# Patient Record
Sex: FEMALE | Race: WHITE | Employment: FULL TIME | URBAN - METROPOLITAN AREA
[De-identification: names, ages, dates, MRNs, and addresses within clinical notes are randomized per-mention and may not be internally consistent; named-entity substitution may affect disease eponyms.]

---

## 2017-01-20 ENCOUNTER — TELEPHONE (OUTPATIENT)
Dept: OBGYN | Facility: CLINIC | Age: 32
End: 2017-01-20

## 2017-01-20 NOTE — TELEPHONE ENCOUNTER
Reason for Call:  Other call back    Detailed comments: She is having spotting issues and  It getting worse    Phone Number Patient can be reached at: Home number on file [phone] (home)    Best Time: Anytime    Can we leave a detailed message on this number? YES    Call taken on 1/20/2017 at 10:52 AM by Maritza Ramirez

## 2017-01-20 NOTE — TELEPHONE ENCOUNTER
Called pt back. She stated at her last annual in December she discussed with Janell Crawford some DUB she was having. Pt states since then her symptoms have gotten worse. Pt states she started to spot on her new OCP during week 1 and week 2 of her pill pack and was having a normal period flow despite taking active pills. Pt stated she then stopped taking the active pills and is now having heavy menstrual flow. Pt states she is needing to change her pads every 2-3 hours, and she is passing some clots pea sized-dime sized, pt states she has been having headaches. Pt states this bleeding has been affecting her quality of life and her relationship with her fiance. Pt denies dizziness/lightheadedness, chest pain, or shortness of breath. Pt states she was told to try to loose some weight but pt states she is unable to despite exercise and changing her eating habits. Informed pt to make an appointment to be seen, and in the meantime if she developed dizziness, lightheadedness, changing pads every hour, chest pain, or SOB to go to ER to be evaluated. pt transferred to scheduling to make an appointment.

## 2017-01-23 ENCOUNTER — OFFICE VISIT (OUTPATIENT)
Dept: OBGYN | Facility: CLINIC | Age: 32
End: 2017-01-23
Payer: COMMERCIAL

## 2017-01-23 VITALS
BODY MASS INDEX: 24.75 KG/M2 | SYSTOLIC BLOOD PRESSURE: 102 MMHG | DIASTOLIC BLOOD PRESSURE: 70 MMHG | HEIGHT: 66 IN | WEIGHT: 154 LBS

## 2017-01-23 DIAGNOSIS — N93.8 DUB (DYSFUNCTIONAL UTERINE BLEEDING): Primary | ICD-10-CM

## 2017-01-23 PROCEDURE — 99213 OFFICE O/P EST LOW 20 MIN: CPT | Performed by: OBSTETRICS & GYNECOLOGY

## 2017-01-23 NOTE — MR AVS SNAPSHOT
"              After Visit Summary   2017    Jennifer Pennington    MRN: [id number]           Patient Information     Date Of Birth          1985        Visit Information        Provider Department      2017 10:00 AM Tanner Benjamin MD [facility]        Today's Diagnoses     DUB (dysfunctional uterine bleeding)    -  1        Follow-ups after your visit        Who to contact     If you have questions or need follow up information about today's clinic visit or your schedule please contact [facility] directly at [phone].  Normal or non-critical lab and imaging results will be communicated to you by SimpleSitehart, letter or phone within 4 business days after the clinic has received the results. If you do not hear from us within 7 days, please contact the clinic through [facility] or phone. If you have a critical or abnormal lab result, we will notify you by phone as soon as possible.  Submit refill requests through Ixtens or call your pharmacy and they will forward the refill request to us. Please allow 3 business days for your refill to be completed.          Additional Information About Your Visit        MyChart Information     Ixtens lets you send messages to your doctor, view your test results, renew your prescriptions, schedule appointments and more. To sign up, go to [email or web address] . Click on \"Log in\" on the left side of the screen, which will take you to the Welcome page. Then click on \"Sign up Now\" on the right side of the page.     You will be asked to enter the access code listed below, as well as some personal information. Please follow the directions to create your username and password.     Your access code is: [email or web address]  Expires: 3/6/2017  9:29 AM     Your access code will  in 90 days. If you need help or a new code, please call your [facility] clinic or [phone].        Care EveryWhere ID     This is your Care EveryWhere ID. This " "could be used by other organizations to access your [facility] medical records  [id number]        Your Vitals Were     Height BMI (Body Mass Index) Last Period             5' 6.25\" (1.683 m) 24.66 kg/m2 (LMP Unknown)          Blood Pressure from Last 3 Encounters:   01/23/17 102/70   12/06/16 112/76   11/18/15 100/58    Weight from Last 3 Encounters:   01/23/17 154 lb (69.854 kg)   12/06/16 154 lb (69.854 kg)   11/18/15 143 lb (64.864 kg)              Today, you had the following     No orders found for display         Today's Medication Changes          These changes are accurate as of: 1/23/17 11:55 AM.  If you have any questions, ask your nurse or doctor.               These medicines have changed or have updated prescriptions.        Dose/Directions    * norgestrel-ethinyl estradiol 0.3-30 MG-MCG per tablet   Commonly known as:  LO/OVRAL   This may have changed:  Another medication with the same name was added. Make sure you understand how and when to take each.   Used for:  Encounter for surveillance of contraceptive pills   Changed by:  Janell Crawford APRN CNP        Dose:  1 tablet   Take 1 tablet by mouth daily   Quantity:  84 tablet   Refills:  4       * norgestrel-ethinyl estradiol 0.5-50 MG-MCG per tablet   Commonly known as:  OVRAL   This may have changed:  You were already taking a medication with the same name, and this prescription was added. Make sure you understand how and when to take each.   Used for:  DUB (dysfunctional uterine bleeding)   Changed by:  Tanner Benjamin MD        Dose:  1 tablet   Take 1 tablet by mouth daily   Quantity:  84 tablet   Refills:  3       * Notice:  This list has 2 medication(s) that are the same as other medications prescribed for you. Read the directions carefully, and ask your doctor or other care provider to review them with you.         Where to get your medicines      These medications were sent to [facility] [address] [place] [address] - [address] AT [facility] [address]  [address] [place] [address]     Phone:  [phone]    - norgestrel-ethinyl estradiol 0.5-50 MG-MCG per tablet             Primary Care Provider Office Phone # Fax #    EBER Molina [phone]       [facility] [address] [place] [address] [address]  [facility] [address]        Thank you!     Thank you for choosing [facility]  for your care. Our goal is always to provide you with excellent care. Hearing back from our patients is one way we can continue to improve our services. Please take a few minutes to complete the written survey that you may receive in the mail after your visit with us. Thank you!             Your Updated Medication List - Protect others around you: Learn how to safely use, store and throw away your medicines at www.disposemymeds.org.          This list is accurate as of: 1/23/17 11:55 AM.  Always use your most recent med list.                   Brand Name Dispense Instructions for use    Multi-vitamin Tabs tablet      Take 1 tablet by mouth daily       * norgestrel-ethinyl estradiol 0.3-30 MG-MCG per tablet    LO/OVRAL    84 tablet    Take 1 tablet by mouth daily       * norgestrel-ethinyl estradiol 0.5-50 MG-MCG per tablet    OVRAL    84 tablet    Take 1 tablet by mouth daily       * Notice:  This list has 2 medication(s) that are the same as other medications prescribed for you. Read the directions carefully, and ask your doctor or other care provider to review them with you.

## 2017-01-23 NOTE — PROGRESS NOTES
SUBJECTIVE:                                                   Jennifer Pennington is a 31 year old female who presents to clinic today for the following health issue(s):  Patient presents with:  Abnormal Bleeding Problem: has been on OCP and started spotting with proper use of taking pill x 4 months, this past cycle worse with heavier bleeding      HPI:The patient is seen at this time for evaluation of dysfunctional bleeding. She has been on birth control pills for abnormal bleeding and has had a progressive increase in breakthrough in the third week to the point where she is now having a full on menses starting a week early.She has no other reason for any abnormal bleeding. Her general review of systems is negative. She takes her pills religiously every 24 hours and is very compliant. She has been on Lo/Ovral offer allup to this point.      No LMP recorded (lmp unknown)..   Patient is sexually active, .  Using oral contraceptives for contraception.    reports that she has never smoked. She has never used smokeless tobacco.  STD testing offered?  Declined  Health maintenance updated:  yes        Problem list and histories reviewed & adjusted, as indicated.  Additional history: as documented.    Patient Active Problem List   Diagnosis     Encounter for initial prescription of contraceptives     Past Surgical History   Procedure Laterality Date     No history of surgery        Social History   Substance Use Topics     Smoking status: Never Smoker      Smokeless tobacco: Never Used     Alcohol Use: 1.8 oz/week     3 Standard drinks or equivalent per week      Problem (# of Occurrences) Relation (Name,Age of Onset)    Breast Cancer (1) Mother    DIABETES (1) Mother (57): type II    Ovarian Cancer (1) Mother    Skin Cancer (1) Mother            Current Outpatient Prescriptions   Medication Sig     norgestrel-ethinyl estradiol (LO/OVRAL) 0.3-30 MG-MCG per tablet Take 1 tablet by mouth daily     multivitamin,  "therapeutic with minerals (MULTI-VITAMIN) TABS Take 1 tablet by mouth daily     No current facility-administered medications for this visit.     Allergies   Allergen Reactions     Seasonal Allergies        ROS:  12 point review of systems negative other than symptoms noted below.  Genitourinary: Heavy Bleeding with Period, Irregular Menses, Spotting and Vaginal Discharge    OBJECTIVE:     /70 mmHg  Ht 1.683 m (5' 6.25\")  Wt 69.854 kg (154 lb)  BMI 24.66 kg/m2  LMP  (LMP Unknown)  Body mass index is 24.66 kg/(m^2).    Exam:  Constitutional:  Appearance: Well nourished, well developed alert, in no acute distress  Gastrointestinal:  Abdominal Examination:  Abdomen nontender to palpation, tone normal without rigidity or guarding, no masses present, umbilicus without lesions; Liver/Spleen:  No hepatomegaly present, liver nontender to palpation; Hernias:  No hernias present  Lymphatic: Lymph Nodes:  No other lymphadenopathy present  Skin:General Inspection:  No rashes present, no lesions present, no areas of discoloration; Genitalia and Groin:  No rashes present, no lesions present, no areas of discoloration, no masses present.  Neurologic/Psychiatric:  Mental Status:  Oriented X3   Pelvic Exam:  External Genitalia:     Normal appearance for age, no discharge present, no tenderness present, no inflammatory lesions present, color normal  Vagina:     Normal vaginal vault without central or paravaginal defects, no discharge present, no inflammatory lesions present, no masses present  Bladder:     Nontender to palpation  Urethra:   Urethral Body:  Urethra palpation normal, urethra structural support normal   Urethral Meatus:  No erythema or lesions present  Cervix:     Appearance healthy, no lesions present, nontender to palpation, no bleeding present  Uterus:     Nontender to palpation, no masses present, position anteflexed, mobility: normal  Adnexa:     No adnexal tenderness present, no adnexal masses " present  Perineum:     Perineum within normal limits, no evidence of trauma, no rashes or skin lesions present  Anus:     Anus within normal limits, no hemorrhoids present  Inguinal Lymph Nodes:     No lymphadenopathy present  Pubic Hair:     Normal pubic hair distribution for age  Genitalia and Groin:     No rashes present, no lesions present, no areas of discoloration, no masses present     In-Clinic Test Results:      ASSESSMENT/PLAN:                                                              The patient has dysfunctional uterine bleeding. I believe this is because of how low dose her pills are. This is not a issue of lack of compliance.Her examination shows no abnormalities in the vagina or on the cervix. There no polyps. Bimanual examination was unremarkable. We will increase her pills up to ovral and see her back in 2 months. She is to start her new pill pack tonight.    Tanner Benjamin MD  [facility] [place]

## 2017-11-22 DIAGNOSIS — N93.8 DUB (DYSFUNCTIONAL UTERINE BLEEDING): ICD-10-CM

## 2017-11-22 NOTE — TELEPHONE ENCOUNTER
norgestrel-ethinyl estradiol (OVRAL) 0.5-50 MG-MCG per tablet  Last Written Prescription Date:  1/23/17  Last Fill Quantity: 84,   # refills: 3  Last Office Visit with [facility] [place]:  1/23/17  Future Office visit: 12/13/17    Routing refill request to provider for review/approval because:  Refill sent. Pt has appointment.

## 2017-12-13 ENCOUNTER — OFFICE VISIT (OUTPATIENT)
Dept: OBGYN | Facility: CLINIC | Age: 32
End: 2017-12-13
Payer: COMMERCIAL

## 2017-12-13 VITALS
DIASTOLIC BLOOD PRESSURE: 64 MMHG | HEART RATE: 80 BPM | WEIGHT: 152.8 LBS | HEIGHT: 67 IN | SYSTOLIC BLOOD PRESSURE: 100 MMHG | BODY MASS INDEX: 23.98 KG/M2

## 2017-12-13 DIAGNOSIS — Z30.41 ENCOUNTER FOR SURVEILLANCE OF CONTRACEPTIVE PILLS: ICD-10-CM

## 2017-12-13 DIAGNOSIS — Z01.419 ENCOUNTER FOR GYNECOLOGICAL EXAMINATION WITHOUT ABNORMAL FINDING: Primary | ICD-10-CM

## 2017-12-13 DIAGNOSIS — Z23 NEED FOR PROPHYLACTIC VACCINATION AND INOCULATION AGAINST INFLUENZA: ICD-10-CM

## 2017-12-13 PROCEDURE — 90686 IIV4 VACC NO PRSV 0.5 ML IM: CPT | Performed by: NURSE PRACTITIONER

## 2017-12-13 PROCEDURE — G0145 SCR C/V CYTO,THINLAYER,RESCR: HCPCS | Performed by: NURSE PRACTITIONER

## 2017-12-13 PROCEDURE — 87624 HPV HI-RISK TYP POOLED RSLT: CPT | Performed by: NURSE PRACTITIONER

## 2017-12-13 PROCEDURE — 99395 PREV VISIT EST AGE 18-39: CPT | Mod: 25 | Performed by: NURSE PRACTITIONER

## 2017-12-13 PROCEDURE — 90471 IMMUNIZATION ADMIN: CPT | Performed by: NURSE PRACTITIONER

## 2017-12-13 RX ORDER — NORETHINDRONE ACETATE AND ETHINYL ESTRADIOL 1.5-30(21)
1 KIT ORAL DAILY
Qty: 84 TABLET | Refills: 3 | Status: SHIPPED | OUTPATIENT
Start: 2017-12-13 | End: 2018-11-02

## 2017-12-13 ASSESSMENT — ANXIETY QUESTIONNAIRES
5. BEING SO RESTLESS THAT IT IS HARD TO SIT STILL: NOT AT ALL
2. NOT BEING ABLE TO STOP OR CONTROL WORRYING: NOT AT ALL
7. FEELING AFRAID AS IF SOMETHING AWFUL MIGHT HAPPEN: NOT AT ALL
3. WORRYING TOO MUCH ABOUT DIFFERENT THINGS: NOT AT ALL
IF YOU CHECKED OFF ANY PROBLEMS ON THIS QUESTIONNAIRE, HOW DIFFICULT HAVE THESE PROBLEMS MADE IT FOR YOU TO DO YOUR WORK, TAKE CARE OF THINGS AT HOME, OR GET ALONG WITH OTHER PEOPLE: NOT DIFFICULT AT ALL
GAD7 TOTAL SCORE: 0
6. BECOMING EASILY ANNOYED OR IRRITABLE: NOT AT ALL
1. FEELING NERVOUS, ANXIOUS, OR ON EDGE: NOT AT ALL

## 2017-12-13 ASSESSMENT — PATIENT HEALTH QUESTIONNAIRE - PHQ9
5. POOR APPETITE OR OVEREATING: NOT AT ALL
SUM OF ALL RESPONSES TO PHQ QUESTIONS 1-9: 0

## 2017-12-13 NOTE — LETTER
December 21, 2017    Jennifer Pennington  [address]  [facility] [address]    Dear Jennifer,  We are happy to inform you that your PAP smear result from 12/13/17 is normal.  We are now able to do a follow up test on PAP smears. The DNA test is for HPV (Human Papilloma Virus). Cervical cancer is closely linked with certain types of HPV. Your result showed no evidence of high risk HPV.  Therefore we recommend you return in 1 year for your next pap smear.  You will still need to return to the clinic every year for an annual exam and other preventive tests.  Please contact the clinic at [phone] with any questions.  Sincerely,    Janell Crawford, EBER CNP/rlm

## 2017-12-13 NOTE — PROGRESS NOTES
Jennifer is a 32 year old  female who presents for annual exam.     Besides routine health maintenance,  she would like to discuss her heavy menses on 2nd day and would like to start having mammograms due to family Hx of breast cancer..    HPI:  The patient's PCP is  EBER Molina CNP.  Pt here today for her annual exam. She has had an eventful year in getting engaged and !     She still struggles with her OCP's. The BTB she was experiencing at the beginning of January has stopped, but day 2 of her menses is so heavy she is changing a super plus tampon every 2 hours and her cramps are back.      GYNECOLOGIC HISTORY:    Patient's last menstrual period was 2017.  Her current contraception method is: oral contraceptives.  She  reports that she has never smoked. She has never used smokeless tobacco.      Patient is sexually active.  STD testing offered?  Declined  Last PHQ-9 score on record =   PHQ-9 SCORE 2017   Total Score 0     Last GAD7 score on record =   PATEL-7 SCORE 2017   Total Score 0     Alcohol Score = 4    HEALTH MAINTENANCE:  Cholesterol: 10/23/2014   Total= 189, Triglycerides=98, HDL=48, VIH=609    Cholesterol   Date Value Ref Range Status   10/23/2014 189 <200 mg/dL Final      Last Mammo: Never had done., Result: not applicable, Next Mammo: Due at age 42.   Pap: (  Lab Results   Component Value Date    PAP NIL 2016    PAP NIL 2015      Colonoscopy:  Never had done, Result: not applicable, Next Colonoscopy: Due at age 52.   Dexa:  Never had done.    Health maintenance updated:  yes    HISTORY:  Obstetric History       T0      L0     SAB0   TAB0   Ectopic0   Multiple0   Live Births0           Patient Active Problem List   Diagnosis     Encounter for initial prescription of contraceptives     Past Surgical History:   Procedure Laterality Date     NO HISTORY OF SURGERY        Social History   Substance Use Topics     Smoking status: Never  "Smoker     Smokeless tobacco: Never Used     Alcohol use 1.8 oz/week     3 Standard drinks or equivalent per week      Problem (# of Occurrences) Relation (Name,Age of Onset)    Breast Cancer (1) Mother (55)    DIABETES (1) Mother (57): type II    Ovarian Cancer (1) Mother (43)    Skin Cancer (1) Mother            Current Outpatient Prescriptions   Medication Sig     norethindrone-ethinyl estradiol-iron (MICROGESTIN FE1.5/30) 1.5-30 MG-MCG per tablet Take 1 tablet by mouth daily     multivitamin, therapeutic with minerals (MULTI-VITAMIN) TABS Take 1 tablet by mouth daily     No current facility-administered medications for this visit.      Allergies   Allergen Reactions     Seasonal Allergies        Past medical, surgical, social and family histories were reviewed and updated in EPIC.    ROS:   12 point review of systems negative other than symptoms noted below.  Genitourinary: Heavy Bleeding with Period    EXAM:  /64  Pulse 80  Ht 5' 6.5\" (1.689 m)  Wt 152 lb 12.8 oz (69.3 kg)  LMP 11/20/2017  BMI 24.29 kg/m2   BMI: Body mass index is 24.29 kg/(m^2).    PHYSICAL EXAM:  Constitutional:  Appearance: Well nourished, well developed, alert, in no acute distress  Neck:  Lymph Nodes:  No lymphadenopathy present    Thyroid:  Gland size normal, nontender, no nodules or masses present  on palpation  Chest:  Respiratory Effort:  Breathing unlabored  Cardiovascular:    Heart: Auscultation:  Regular rate, normal rhythm, no murmurs present  Breasts: Inspection of Breasts:  No lymphadenopathy present., Palpation of Breasts and Axillae:  No masses present on palpation, no breast tenderness., Axillary Lymph Nodes:  No lymphadenopathy present. and No nodularity, asymmetry or nipple discharge bilaterally.  Gastrointestinal:   Abdominal Examination:  Abdomen nontender to palpation, tone normal without rigidity or guarding, no masses present, umbilicus without lesions   Liver and Spleen:  No hepatomegaly present, liver " nontender to palpation    Hernias:  No hernias present  Lymphatic: Lymph Nodes:  No other lymphadenopathy present  Skin:  General Inspection:  No rashes present, no lesions present, no areas of  discoloration    Genitalia and Groin:  No rashes present, no lesions present, no areas of  discoloration, no masses present  Neurologic/Psychiatric:    Mental Status:  Oriented X3     Pelvic Exam:  External Genitalia:     Normal appearance for age, no discharge present, no tenderness present, no inflammatory lesions present, color normal  Vagina:     Normal vaginal vault without central or paravaginal defects, no discharge present, no inflammatory lesions present, no masses present  Bladder:     Nontender to palpation  Urethra:   Urethral Body:  Urethra palpation normal, urethra structural support normal   Urethral Meatus:  No erythema or lesions present  Cervix:     Appearance healthy, no lesions present, nontender to palpation, no bleeding present  Uterus:     Uterus: firm, normal sized and nontender, anteverted in position.   Adnexa:     No adnexal tenderness present, no adnexal masses present  Perineum:     Perineum within normal limits, no evidence of trauma, no rashes or skin lesions present  Anus:     Anus within normal limits, no hemorrhoids present  Inguinal Lymph Nodes:     No lymphadenopathy present  Pubic Hair:     Normal pubic hair distribution for age  Genitalia and Groin:     No rashes present, no lesions present, no areas of discoloration, no masses present      COUNSELING:   Special attention given to:        Regular exercise       Healthy diet/nutrition       Contraception    BMI: Body mass index is 24.29 kg/(m^2).        ASSESSMENT:  32 year old female with satisfactory annual exam.    ICD-10-CM    1. Encounter for gynecological examination without abnormal finding Z01.419 Pap imaged thin layer screen with HPV - recommended age 30 - 65     HPV High Risk Types DNA Cervical   2. Encounter for surveillance of  contraceptive pills Z30.41 norethindrone-ethinyl estradiol-iron (MICROGESTIN FE1.5/30) 1.5-30 MG-MCG per tablet   3. Need for prophylactic vaccination and inoculation against influenza Z23 FLU VAC, SPLIT VIRUS IM > 3 YO (QUADRIVALENT) [97507]     Vaccine Administration, Initial [07721]       PLAN:  Healthy, normal gyn exam. Will change OCP to higher progestin. Flu shot today. She will check on mammograms, her mother was diagnosed at age 55, but she thinks her sister gets mammos annually.  Annual pap screening recommended. ca125 next year again.    EBER Molina CNP

## 2017-12-13 NOTE — MR AVS SNAPSHOT
"              After Visit Summary   12/13/2017    Jennifer Pennington    MRN: [id number]           Patient Information     Date Of Birth          1985        Visit Information        Provider Department      12/13/2017 8:00 AM Janell Crawford APRN CNP [facility]        Today's Diagnoses     Encounter for gynecological examination without abnormal finding    -  1    Encounter for surveillance of contraceptive pills        Need for prophylactic vaccination and inoculation against influenza           Follow-ups after your visit        Follow-up notes from your care team     Return in about 1 year (around 12/13/2018).      Who to contact     If you have questions or need follow up information about today's clinic visit or your schedule please contact [facility] directly at [phone].  Normal or non-critical lab and imaging results will be communicated to you by [employer], letter or phone within 4 business days after the clinic has received the results. If you do not hear from us within 7 days, please contact the clinic through [employer] or phone. If you have a critical or abnormal lab result, we will notify you by phone as soon as possible.  Submit refill requests through [employer] or call your pharmacy and they will forward the refill request to us. Please allow 3 business days for your refill to be completed.          Additional Information About Your Visit        MyChart Information     [employer] lets you send messages to your doctor, view your test results, renew your prescriptions, schedule appointments and more. To sign up, go to [email or web address] . Click on \"Log in\" on the left side of the screen, which will take you to the Welcome page. Then click on \"Sign up Now\" on the right side of the page.     You will be asked to enter the access code listed below, as well as some personal information. Please follow the directions to create your username and password.   " "  Your access code is: [email or web address]  Expires: 3/13/2018  9:05 AM     Your access code will  in 90 days. If you need help or a new code, please call your [place] clinic or [phone].        Care EveryWhere ID     This is your Care EveryWhere ID. This could be used by other organizations to access your [place] medical records  [id number]        Your Vitals Were     Pulse Height Last Period BMI (Body Mass Index)          80 5' 6.5\" (1.689 m) 2017 24.29 kg/m2         Blood Pressure from Last 3 Encounters:   17 100/64   17 102/70   16 112/76    Weight from Last 3 Encounters:   17 152 lb 12.8 oz (69.3 kg)   17 154 lb (69.9 kg)   16 154 lb (69.9 kg)              We Performed the Following     FLU VAC, SPLIT VIRUS IM > 3 YO (QUADRIVALENT) [52733]     HPV High Risk Types DNA Cervical     Pap imaged thin layer screen with HPV - recommended age 30 - 65     Vaccine Administration, Initial [97879]          Today's Medication Changes          These changes are accurate as of: 17  9:05 AM.  If you have any questions, ask your nurse or doctor.               Start taking these medicines.        Dose/Directions    norethindrone-ethinyl estradiol-iron 1.5-30 MG-MCG per tablet   Commonly known as:  MICROGESTIN FE1.5/30   Used for:  Encounter for surveillance of contraceptive pills   Started by:  Janell Crawford APRN CNP        Dose:  1 tablet   Take 1 tablet by mouth daily   Quantity:  84 tablet   Refills:  3         Stop taking these medicines if you haven't already. Please contact your care team if you have questions.     norgestrel-ethinyl estradiol 0.3-30 MG-MCG per tablet   Commonly known as:  LO/OVRAL   Stopped by:  Janell Crawford APRN CNP           OGESTREL 0.5-50 MG-MCG per tablet   Generic drug:  norgestrel-ethinyl estradiol   Stopped by:  Janell Crawford APRN CNP                Where to get your medicines      These medications were sent to [facility] - [place] - [address] AT [facility] OF [address]  [address] [address]     Phone:  [phone]     norethindrone-ethinyl estradiol-iron 1.5-30 MG-MCG per tablet                Primary Care Provider Office Phone # Fax #    Janell Crawford, APRN [phone] [address]  [place] [address]        Equal Access to Services     [facility]: Hadii aad ku hadasho Soomaali, waaxda luqadaha, qaybta kaalmada adeegyada, laly may . So [facility] [phone].    ATENCIÓN: Si habla español, tiene a ayala disposición servicios gratuitos de asistencia lingüística. [facility] [phone].    We comply with applicable federal civil rights laws and [place] laws. We do not discriminate on the basis of race, color, national origin, age, disability, sex, sexual orientation, or gender identity.            Thank you!     Thank you for choosing [facility] [place]  for your care. Our goal is always to provide you with excellent care. Hearing back from our patients is one way we can continue to improve our services. Please take a few minutes to complete the written survey that you may receive in the mail after your visit with us. Thank you!             Your Updated Medication List - Protect others around you: Learn how to safely use, store and throw away your medicines at www.disposemymeds.org.          This list is accurate as of: 12/13/17  9:05 AM.  Always use your most recent med list.                   Brand Name Dispense Instructions for use Diagnosis    Multi-vitamin Tabs tablet      Take 1 tablet by mouth daily        norethindrone-ethinyl estradiol-iron 1.5-30 MG-MCG per tablet    MICROGESTIN FE1.5/30    84 tablet    Take 1 tablet by mouth daily    Encounter for surveillance of contraceptive pills

## 2017-12-13 NOTE — PROGRESS NOTES

## 2017-12-14 ASSESSMENT — ANXIETY QUESTIONNAIRES: GAD7 TOTAL SCORE: 0

## 2017-12-15 LAB
COPATH REPORT: NORMAL
PAP: NORMAL

## 2017-12-19 LAB
FINAL DIAGNOSIS: NORMAL
HPV HR 12 DNA CVX QL NAA+PROBE: NEGATIVE
HPV16 DNA SPEC QL NAA+PROBE: NEGATIVE
HPV18 DNA SPEC QL NAA+PROBE: NEGATIVE
SPECIMEN DESCRIPTION: NORMAL

## 2018-02-17 DIAGNOSIS — N93.8 DUB (DYSFUNCTIONAL UTERINE BLEEDING): ICD-10-CM

## 2018-02-19 NOTE — TELEPHONE ENCOUNTER
OGESTREL 0.5-50      Last Written Prescription Date:  11/22/17  Last Fill Quantity: 84,   # refills: 0  Last Office Visit: 12/13/17  Future Office visit:   none    At 12/13/17 visit pt was switched to new OCP. Rx denied.

## 2018-11-02 DIAGNOSIS — Z30.41 ENCOUNTER FOR SURVEILLANCE OF CONTRACEPTIVE PILLS: ICD-10-CM

## 2018-11-02 RX ORDER — NORETHINDRONE ACETATE AND ETHINYL ESTRADIOL 1.5-30(21)
KIT ORAL
Qty: 84 TABLET | Refills: 0 | Status: SHIPPED | OUTPATIENT
Start: 2018-11-02 | End: 2018-12-26

## 2018-11-02 NOTE — TELEPHONE ENCOUNTER
"Requested Prescriptions   Pending Prescriptions Disp Refills     BLISOVI FE 1.5/30 1.5-30 MG-MCG per tablet [Pharmacy Med Name: BLISOVI FE 1.5/30 TABLETS 28S] 84 tablet 0     Sig: TAKE 1 TABLET BY MOUTH DAILY    Contraceptives Protocol Passed    11/2/2018  9:23 AM       Passed - Patient is not a current smoker if age is 35 or older       Passed - Recent (12 mo) or future (30 days) visit within the authorizing provider's specialty    Patient had office visit in the last 12 months or has a visit in the next 30 days with authorizing provider or within the authorizing provider's specialty.  See \"Patient Info\" tab in inbasket, or \"Choose Columns\" in Meds & Orders section of the refill encounter.             Passed - No active pregnancy on record       Passed - No positive pregnancy test in past 12 months        Last Written Prescription Date:  12/13/17  Last Fill Quantity: 84,  # refills: 3   Last office visit: 12/13/2017 with prescribing provider:  Janell Crawford   Future Office Visit:  None    "

## 2018-12-26 ENCOUNTER — OFFICE VISIT (OUTPATIENT)
Dept: OBGYN | Facility: CLINIC | Age: 33
End: 2018-12-26
Payer: COMMERCIAL

## 2018-12-26 VITALS
BODY MASS INDEX: 26.3 KG/M2 | HEIGHT: 67 IN | WEIGHT: 167.6 LBS | SYSTOLIC BLOOD PRESSURE: 102 MMHG | HEART RATE: 74 BPM | DIASTOLIC BLOOD PRESSURE: 68 MMHG

## 2018-12-26 DIAGNOSIS — Z13.220 ENCOUNTER FOR LIPID SCREENING FOR CARDIOVASCULAR DISEASE: ICD-10-CM

## 2018-12-26 DIAGNOSIS — Z30.41 ENCOUNTER FOR SURVEILLANCE OF CONTRACEPTIVE PILLS: ICD-10-CM

## 2018-12-26 DIAGNOSIS — Z13.1 SCREENING FOR DIABETES MELLITUS: ICD-10-CM

## 2018-12-26 DIAGNOSIS — Z01.419 ENCOUNTER FOR GYNECOLOGICAL EXAMINATION WITHOUT ABNORMAL FINDING: Primary | ICD-10-CM

## 2018-12-26 DIAGNOSIS — Z80.41 FAMILY HISTORY OF OVARIAN CANCER: ICD-10-CM

## 2018-12-26 DIAGNOSIS — Z13.6 ENCOUNTER FOR LIPID SCREENING FOR CARDIOVASCULAR DISEASE: ICD-10-CM

## 2018-12-26 LAB
CANCER AG125 SERPL-ACNC: 14 U/ML (ref 0–30)
CHOLEST SERPL-MCNC: 254 MG/DL
GLUCOSE BLD-MCNC: 82 MG/DL (ref 70–99)
HDLC SERPL-MCNC: 54 MG/DL
LDLC SERPL CALC-MCNC: 164 MG/DL
NONHDLC SERPL-MCNC: 200 MG/DL
TRIGL SERPL-MCNC: 182 MG/DL

## 2018-12-26 PROCEDURE — 99395 PREV VISIT EST AGE 18-39: CPT | Performed by: NURSE PRACTITIONER

## 2018-12-26 PROCEDURE — 87624 HPV HI-RISK TYP POOLED RSLT: CPT | Performed by: NURSE PRACTITIONER

## 2018-12-26 PROCEDURE — 82947 ASSAY GLUCOSE BLOOD QUANT: CPT | Performed by: NURSE PRACTITIONER

## 2018-12-26 PROCEDURE — 80061 LIPID PANEL: CPT | Performed by: NURSE PRACTITIONER

## 2018-12-26 PROCEDURE — 86304 IMMUNOASSAY TUMOR CA 125: CPT | Performed by: NURSE PRACTITIONER

## 2018-12-26 PROCEDURE — 36415 COLL VENOUS BLD VENIPUNCTURE: CPT | Performed by: NURSE PRACTITIONER

## 2018-12-26 PROCEDURE — G0145 SCR C/V CYTO,THINLAYER,RESCR: HCPCS | Performed by: NURSE PRACTITIONER

## 2018-12-26 RX ORDER — NORETHINDRONE ACETATE AND ETHINYL ESTRADIOL 1.5-30(21)
1 KIT ORAL DAILY
Qty: 84 TABLET | Refills: 3 | Status: SHIPPED | OUTPATIENT
Start: 2018-12-26 | End: 2019-12-31

## 2018-12-26 ASSESSMENT — MIFFLIN-ST. JEOR: SCORE: 1503.89

## 2018-12-26 ASSESSMENT — ANXIETY QUESTIONNAIRES
GAD7 TOTAL SCORE: 0
5. BEING SO RESTLESS THAT IT IS HARD TO SIT STILL: NOT AT ALL
7. FEELING AFRAID AS IF SOMETHING AWFUL MIGHT HAPPEN: NOT AT ALL
2. NOT BEING ABLE TO STOP OR CONTROL WORRYING: NOT AT ALL
3. WORRYING TOO MUCH ABOUT DIFFERENT THINGS: NOT AT ALL
1. FEELING NERVOUS, ANXIOUS, OR ON EDGE: NOT AT ALL
6. BECOMING EASILY ANNOYED OR IRRITABLE: NOT AT ALL
IF YOU CHECKED OFF ANY PROBLEMS ON THIS QUESTIONNAIRE, HOW DIFFICULT HAVE THESE PROBLEMS MADE IT FOR YOU TO DO YOUR WORK, TAKE CARE OF THINGS AT HOME, OR GET ALONG WITH OTHER PEOPLE: NOT DIFFICULT AT ALL

## 2018-12-26 ASSESSMENT — PATIENT HEALTH QUESTIONNAIRE - PHQ9
SUM OF ALL RESPONSES TO PHQ QUESTIONS 1-9: 0
5. POOR APPETITE OR OVEREATING: NOT AT ALL

## 2018-12-26 NOTE — PROGRESS NOTES
Jennifer is a 33 year old  female who presents for annual exam.     Besides routine health maintenance, she has no other health concerns today .    HPI:  The patient's PCP is  EBER Molina CNP.  Pt here today for her annual exam. She is feelign well.     She and her  are in the process of becoming foster parents. Age range 3-15. She is unsure if she want's children.     She's been on OCP's now and has regular menses. Day 2 is the heaviest and most crampy, but it's better than being off OCP's.     Hx of abnormal pap in , NIL since . Pt request annual screening due to this and her mother with breast and ovarian cancer.    She is fasting and will do blood work as well as update her Ca125, which we check every other year. Screening mammogram at 42.      GYNECOLOGIC HISTORY:    Patient's last menstrual period was 2018 (exact date).  Her current contraception method is: oral contraceptives.  She  reports that  has never smoked. she has never used smokeless tobacco.    Patient is sexually active.  STD testing offered?  Declined  Last PHQ-9 score on record =   PHQ-9 SCORE 2018   PHQ-9 Total Score 0     Last GAD7 score on record =   PATEL-7 SCORE 2018   Total Score 0     Alcohol Score = 3    HEALTH MAINTENANCE:  Cholesterol: 10/23/2014   Total= 189, Triglycerides=98, HDL=48, JGZ=840    Last Mammo: NA, Result: not applicable, Next Mammo: 9 years ago.   Pap: 2017 NIL, HPV-  Lab Results   Component Value Date    PAP NIL 2017    PAP NIL 2016    PAP NIL 2015     Colonoscopy:  NA, Result: not applicable, Next Colonoscopy: 19 years.  Dexa:  NA    Health maintenance updated:  yes    HISTORY:  Obstetric History       T0      L0     SAB0   TAB0   Ectopic0   Multiple0   Live Births0           Patient Active Problem List   Diagnosis     Encounter for initial prescription of contraceptives     Past Surgical History:   Procedure Laterality Date     NO  "HISTORY OF SURGERY        Social History     Tobacco Use     Smoking status: Never Smoker     Smokeless tobacco: Never Used   Substance Use Topics     Alcohol use: Yes     Alcohol/week: 1.8 oz     Types: 3 Standard drinks or equivalent per week      Problem (# of Occurrences) Relation (Name,Age of Onset)    Breast Cancer (1) Mother (55)    Diabetes (1) Mother (57): type II    Ovarian Cancer (1) Mother (43)    Skin Cancer (1) Mother            Current Outpatient Medications   Medication Sig     norethindrone-ethinyl estradiol-iron (BLISOVI FE 1.5/30) 1.5-30 MG-MCG tablet Take 1 tablet by mouth daily     No current facility-administered medications for this visit.      Allergies   Allergen Reactions     Seasonal Allergies        Past medical, surgical, social and family histories were reviewed and updated in EPIC.    ROS:   12 point review of systems negative other than symptoms noted below.    EXAM:  /68   Pulse 74   Ht 1.695 m (5' 6.75\")   Wt 76 kg (167 lb 9.6 oz)   LMP 11/28/2018 (Exact Date)   Breastfeeding? No   BMI 26.45 kg/m     BMI: Body mass index is 26.45 kg/m .    PHYSICAL EXAM:  Constitutional:  Appearance: Well nourished, well developed, alert, in no acute distress  Neck:  Lymph Nodes:  No lymphadenopathy present    Thyroid:  Gland size normal, nontender, no nodules or masses present  on palpation  Chest:  Respiratory Effort:  Breathing unlabored  Cardiovascular:    Heart: Auscultation:  Regular rate, normal rhythm, no murmurs present  Breasts: Inspection of Breasts:  No lymphadenopathy present., Palpation of Breasts and Axillae:  No masses present on palpation, no breast tenderness., Axillary Lymph Nodes:  No lymphadenopathy present. and No nodularity, asymmetry or nipple discharge bilaterally.  Gastrointestinal:   Abdominal Examination:  Abdomen nontender to palpation, tone normal without rigidity or guarding, no masses present, umbilicus without lesions   Liver and Spleen:  No hepatomegaly " present, liver nontender to palpation    Hernias:  No hernias present  Lymphatic: Lymph Nodes:  No other lymphadenopathy present  Skin:  General Inspection:  No rashes present, no lesions present, no areas of  discoloration    Genitalia and Groin:  No rashes present, no lesions present, no areas of  discoloration, no masses present  Neurologic/Psychiatric:    Mental Status:  Oriented X3     Pelvic Exam:  External Genitalia:     Normal appearance for age, no discharge present, no tenderness present, no inflammatory lesions present, color normal  Vagina:     Normal vaginal vault without central or paravaginal defects, no discharge present, no inflammatory lesions present, no masses present  Bladder:     Nontender to palpation  Urethra:   Urethral Body:  Urethra palpation normal, urethra structural support normal   Urethral Meatus:  No erythema or lesions present  Cervix:     Appearance healthy, no lesions present, nontender to palpation, no bleeding present  Uterus:     Uterus: firm, normal sized and nontender, midplane in position.   Adnexa:     No adnexal tenderness present, no adnexal masses present  Perineum:     Perineum within normal limits, no evidence of trauma, no rashes or skin lesions present  Anus:     Anus within normal limits, no hemorrhoids present  Inguinal Lymph Nodes:     No lymphadenopathy present  Pubic Hair:     Normal pubic hair distribution for age  Genitalia and Groin:     No rashes present, no lesions present, no areas of discoloration, no masses present      COUNSELING:   Special attention given to:        Regular exercise       Healthy diet/nutrition       Contraception    BMI: Body mass index is 26.45 kg/m .  Weight management plan: Discussed healthy diet and exercise guidelines    ASSESSMENT:  33 year old female with satisfactory annual exam.    ICD-10-CM    1. Encounter for gynecological examination without abnormal finding Z01.419 Pap imaged thin layer screen with HPV - recommended age  30 - 65     HPV High Risk Types DNA Cervical   2. Encounter for surveillance of contraceptive pills Z30.41 norethindrone-ethinyl estradiol-iron (BLISOVI FE 1.5/30) 1.5-30 MG-MCG tablet   3. Encounter for lipid screening for cardiovascular disease Z13.220 Lipid Profile (Chol, Trig, HDL, LDL calc)    Z13.6    4. Screening for diabetes mellitus Z13.1 Glucose, whole blood   5. Family history of ovarian cancer Z80.41        PLAN:  Healthy, normal gyn exam. Pap guidelines discussed. Will do annual pap screening. Pap collected today. OCPx1 year. Fasting labs and Ca125    EBER Molina CNP

## 2018-12-27 ASSESSMENT — ANXIETY QUESTIONNAIRES: GAD7 TOTAL SCORE: 0

## 2018-12-28 LAB
COPATH REPORT: NORMAL
PAP: NORMAL

## 2018-12-31 LAB
FINAL DIAGNOSIS: NORMAL
HPV HR 12 DNA CVX QL NAA+PROBE: NEGATIVE
HPV16 DNA SPEC QL NAA+PROBE: NEGATIVE
HPV18 DNA SPEC QL NAA+PROBE: NEGATIVE
SPECIMEN DESCRIPTION: NORMAL
SPECIMEN SOURCE CVX/VAG CYTO: NORMAL

## 2019-01-27 DIAGNOSIS — Z30.41 ENCOUNTER FOR SURVEILLANCE OF CONTRACEPTIVE PILLS: ICD-10-CM

## 2019-01-28 RX ORDER — NORETHINDRONE ACETATE AND ETHINYL ESTRADIOL 1.5-30(21)
KIT ORAL
Qty: 84 TABLET | Refills: 0 | OUTPATIENT
Start: 2019-01-28

## 2019-01-28 NOTE — TELEPHONE ENCOUNTER
"Requested Prescriptions   Pending Prescriptions Disp Refills     BLISOVI FE 1.5/30 1.5-30 MG-MCG tablet [Pharmacy Med Name: BLISOVI FE 1.5/30 TABLETS 28S] 84 tablet 0     Sig: TAKE 1 TABLET BY MOUTH DAILY    Contraceptives Protocol Passed - 1/27/2019  1:30 PM       Passed - Patient is not a current smoker if age is 35 or older       Passed - Recent (12 mo) or future (30 days) visit within the authorizing provider's specialty    Patient had office visit in the last 12 months or has a visit in the next 30 days with authorizing provider or within the authorizing provider's specialty.  See \"Patient Info\" tab in inbasket, or \"Choose Columns\" in Meds & Orders section of the refill encounter.             Passed - Medication is active on med list       Passed - No active pregnancy on record       Passed - No positive pregnancy test in past 12 months          Refill denied, pt has refills available  Sariah Hill RN on 1/28/2019 at 7:46 AM    "

## 2019-10-03 ENCOUNTER — HEALTH MAINTENANCE LETTER (OUTPATIENT)
Age: 34
End: 2019-10-03

## 2019-10-10 ENCOUNTER — TRANSFERRED RECORDS (OUTPATIENT)
Dept: HEALTH INFORMATION MANAGEMENT | Facility: CLINIC | Age: 34
End: 2019-10-10

## 2019-12-24 NOTE — PROGRESS NOTES
Jennifer is a 34 year old  female who presents for annual exam.     Besides routine health maintenance, she has no other health concerns today .    HPI:here for annual exam.   Patient had stopped OCP's in October, was having some back problems and chiropractor recommended she stop all medication to see if helped back.  She states she is feeling better and cycles are normal last 4-6 days.  She is fasting for blood work, wants  done due to mom's history of breast and ovarian cancer.  Also discussed doing a pelvic US, having some abdominal bloating off and on.    The patient's PCP is EBER Molina CNP.       GYNECOLOGIC HISTORY:    Patient's last menstrual period was 2019 (exact date).    Regular menses? yes  Menses every 28-31 days.  Length of menses: 6 days    Her current contraception method is: condoms.  She  reports that she has never smoked. She has never used smokeless tobacco.    Patient is sexually active.  STD testing offered?  Declined  Last PHQ-9 score on record =   PHQ-9 SCORE 2019   PHQ-9 Total Score 1     Last GAD7 score on record =   PATEL-7 SCORE 2019   Total Score 3     Alcohol Score = 4    HEALTH MAINTENANCE:  Cholesterol:    Recent Labs   Lab Test 18  0758 10/23/14   CHOL 254* 189   HDL 54 48   * 121*   TRIG 182* 98     Last Mammo: Not applicable, Result: Not applicable, Next Mammo: Due at age 40  Pap:   Lab Results   Component Value Date    PAP NIL, HPV- 2018    PAP NIL 2017    PAP NIL 2016      Colonoscopy:  NA, Result: Not applicable, Next Colonoscopy: Age 50  Dexa:  NA    Health maintenance updated: yes, reviewed vaccines. Patient received flu shot in Nov    HISTORY:  OB History    Para Term  AB Living   0 0 0 0 0 0   SAB TAB Ectopic Multiple Live Births   0 0 0 0 0       Patient Active Problem List   Diagnosis     Encounter for initial prescription of contraceptives     Past Surgical History:   Procedure  "Laterality Date     NO HISTORY OF SURGERY        Social History     Tobacco Use     Smoking status: Never Smoker     Smokeless tobacco: Never Used   Substance Use Topics     Alcohol use: Yes     Alcohol/week: 3.0 standard drinks     Types: 3 Standard drinks or equivalent per week      Problem (# of Occurrences) Relation (Name,Age of Onset)    Breast Cancer (1) Mother (55)    Diabetes (1) Mother (57): type II    No Known Problems (7) Father, Sister, Brother, Maternal Grandmother, Maternal Grandfather, Paternal Grandmother, Other    Ovarian Cancer (1) Mother (43)    Skin Cancer (1) Mother            No current outpatient medications on file.     No current facility-administered medications for this visit.      Allergies   Allergen Reactions     Seasonal Allergies        Past medical, surgical, social and family histories were reviewed and updated in EPIC.    ROS:   12 point review of systems negative other than symptoms noted below or in the HPI.  Normal menstrual cycles    EXAM:  /64   Pulse 76   Ht 1.702 m (5' 7\")   Wt 73.7 kg (162 lb 6.4 oz)   LMP 12/17/2019 (Exact Date)   BMI 25.44 kg/m     BMI: Body mass index is 25.44 kg/m .    PHYSICAL EXAM:  Constitutional:   Appearance: Well nourished, well developed, alert, in no acute distress  Neck:  Lymph Nodes:  No lymphadenopathy present    Thyroid:  Gland size normal, nontender, no nodules or masses present  on palpation  Chest:  Respiratory Effort:  Breathing unlabored  Cardiovascular:    Heart: Auscultation:  Regular rate, normal rhythm, no murmurs present  Breasts: Inspection of Breasts:  No lymphadenopathy present., Palpation of Breasts and Axillae:  No masses present on palpation, no breast tenderness., Axillary Lymph Nodes:  No lymphadenopathy present. and No nodularity, asymmetry or nipple discharge bilaterally.  Gastrointestinal:   Abdominal Examination:  Abdomen nontender to palpation, tone normal without rigidity or guarding, no masses present, " umbilicus without lesions   Liver and Spleen:  No hepatomegaly present, liver nontender to palpation    Hernias:  No hernias present  Lymphatic: Lymph Nodes:  No other lymphadenopathy present  Skin:  General Inspection:  No rashes present, no lesions present, no areas of  discoloration  Neurologic:    Mental Status:  Oriented X3.  Normal strength and tone, sensory exam                grossly normal, mentation intact and speech normal.    Psychiatric:   Mentation appears normal and affect normal/bright.         Pelvic Exam:  External Genitalia:     Normal appearance for age, no discharge present, no tenderness present, no inflammatory lesions present, color normal  Vagina:     Normal vaginal vault without central or paravaginal defects, no discharge present, no inflammatory lesions present, no masses present  Bladder:     Nontender to palpation  Urethra:   Urethral Body:  Urethra palpation normal, urethra structural support normal   Urethral Meatus:  No erythema or lesions present  Cervix:     Appearance healthy, no lesions present, nontender to palpation, no bleeding present  Uterus:     Uterus: firm, normal sized and nontender, anteverted in position.   Adnexa:     No adnexal tenderness present, no adnexal masses present  Perineum:     Perineum within normal limits, no evidence of trauma, no rashes or skin lesions present  Anus:     Anus within normal limits, no hemorrhoids present  Inguinal Lymph Nodes:     No lymphadenopathy present  Pubic Hair:     Normal pubic hair distribution for age  Genitalia and Groin:     No rashes present, no lesions present, no areas of discoloration, no masses present      COUNSELING:   Reviewed preventive health counseling, as reflected in patient instructions       Regular exercise       Healthy diet/nutrition       Contraception    BMI: Body mass index is 25.44 kg/m .      ASSESSMENT:  34 year old female with satisfactory annual exam.    ICD-10-CM    1. Encounter for lipid screening  for cardiovascular disease Z13.220 Lipid panel reflex to direct LDL Fasting    Z13.6    2. Family history of ovarian cancer Z80.41    3. Screening for diabetes mellitus Z13.1 Glucose, whole blood   4. Low iron E61.1 Hemoglobin   5. Abdominal bloating R14.0 US Transvaginal Non OB   6. FHx: cancer of ovary Z80.41 US Transvaginal Non OB       PLAN:  Normal Gyn exam.  Will notify patient with lab results  When available.  Return prn or 1 year for annual. Patient requesting yearly paps due to mom's history of breast and ovarian cancer.    Jessica Shelby, EBER CNP

## 2019-12-31 ENCOUNTER — OFFICE VISIT (OUTPATIENT)
Dept: OBGYN | Facility: CLINIC | Age: 34
End: 2019-12-31
Payer: COMMERCIAL

## 2019-12-31 VITALS
HEART RATE: 76 BPM | BODY MASS INDEX: 25.49 KG/M2 | DIASTOLIC BLOOD PRESSURE: 64 MMHG | WEIGHT: 162.4 LBS | HEIGHT: 67 IN | SYSTOLIC BLOOD PRESSURE: 106 MMHG

## 2019-12-31 DIAGNOSIS — Z80.41 FAMILY HISTORY OF OVARIAN CANCER: ICD-10-CM

## 2019-12-31 DIAGNOSIS — Z13.6 ENCOUNTER FOR LIPID SCREENING FOR CARDIOVASCULAR DISEASE: Primary | ICD-10-CM

## 2019-12-31 DIAGNOSIS — Z80.41 FHX: CANCER OF OVARY: ICD-10-CM

## 2019-12-31 DIAGNOSIS — Z13.220 ENCOUNTER FOR LIPID SCREENING FOR CARDIOVASCULAR DISEASE: Primary | ICD-10-CM

## 2019-12-31 DIAGNOSIS — R14.0 ABDOMINAL BLOATING: ICD-10-CM

## 2019-12-31 DIAGNOSIS — Z12.4 ROUTINE CERVICAL SMEAR: ICD-10-CM

## 2019-12-31 DIAGNOSIS — Z13.1 SCREENING FOR DIABETES MELLITUS: ICD-10-CM

## 2019-12-31 DIAGNOSIS — E61.1 LOW IRON: ICD-10-CM

## 2019-12-31 LAB
CANCER AG125 SERPL-ACNC: 12 U/ML (ref 0–30)
CHOLEST SERPL-MCNC: 221 MG/DL
GLUCOSE BLD-MCNC: 82 MG/DL (ref 70–99)
HDLC SERPL-MCNC: 54 MG/DL
HGB BLD-MCNC: 12.2 G/DL (ref 11.7–15.7)
LDLC SERPL CALC-MCNC: 143 MG/DL
NONHDLC SERPL-MCNC: 167 MG/DL
TRIGL SERPL-MCNC: 122 MG/DL

## 2019-12-31 PROCEDURE — 36415 COLL VENOUS BLD VENIPUNCTURE: CPT | Performed by: NURSE PRACTITIONER

## 2019-12-31 PROCEDURE — 87624 HPV HI-RISK TYP POOLED RSLT: CPT | Performed by: NURSE PRACTITIONER

## 2019-12-31 PROCEDURE — 85018 HEMOGLOBIN: CPT | Performed by: NURSE PRACTITIONER

## 2019-12-31 PROCEDURE — 82947 ASSAY GLUCOSE BLOOD QUANT: CPT | Performed by: NURSE PRACTITIONER

## 2019-12-31 PROCEDURE — 86304 IMMUNOASSAY TUMOR CA 125: CPT | Performed by: NURSE PRACTITIONER

## 2019-12-31 PROCEDURE — 80061 LIPID PANEL: CPT | Performed by: NURSE PRACTITIONER

## 2019-12-31 PROCEDURE — 99395 PREV VISIT EST AGE 18-39: CPT | Performed by: NURSE PRACTITIONER

## 2019-12-31 PROCEDURE — G0145 SCR C/V CYTO,THINLAYER,RESCR: HCPCS | Performed by: NURSE PRACTITIONER

## 2019-12-31 ASSESSMENT — ANXIETY QUESTIONNAIRES
1. FEELING NERVOUS, ANXIOUS, OR ON EDGE: SEVERAL DAYS
7. FEELING AFRAID AS IF SOMETHING AWFUL MIGHT HAPPEN: NOT AT ALL
2. NOT BEING ABLE TO STOP OR CONTROL WORRYING: NOT AT ALL
5. BEING SO RESTLESS THAT IT IS HARD TO SIT STILL: NOT AT ALL
6. BECOMING EASILY ANNOYED OR IRRITABLE: SEVERAL DAYS
3. WORRYING TOO MUCH ABOUT DIFFERENT THINGS: SEVERAL DAYS
IF YOU CHECKED OFF ANY PROBLEMS ON THIS QUESTIONNAIRE, HOW DIFFICULT HAVE THESE PROBLEMS MADE IT FOR YOU TO DO YOUR WORK, TAKE CARE OF THINGS AT HOME, OR GET ALONG WITH OTHER PEOPLE: NOT DIFFICULT AT ALL
GAD7 TOTAL SCORE: 3

## 2019-12-31 ASSESSMENT — PATIENT HEALTH QUESTIONNAIRE - PHQ9
SUM OF ALL RESPONSES TO PHQ QUESTIONS 1-9: 1
5. POOR APPETITE OR OVEREATING: NOT AT ALL

## 2019-12-31 ASSESSMENT — MIFFLIN-ST. JEOR: SCORE: 1479.27

## 2019-12-31 NOTE — RESULT ENCOUNTER NOTE
Jennifer      Your results are normal.  If further questions please give me a call.    Rosalva Shelby RNC

## 2020-01-01 ASSESSMENT — ANXIETY QUESTIONNAIRES: GAD7 TOTAL SCORE: 3

## 2020-01-03 LAB
COPATH REPORT: NORMAL
PAP: NORMAL

## 2020-01-06 NOTE — RESULT ENCOUNTER NOTE
Jennifer      Your results are normal, your lipids have decreased and improved from last year.  Watch diet and increase exercise will help. If further questions please give me a call.    Rosalva Shelby RNC

## 2021-01-15 ENCOUNTER — HEALTH MAINTENANCE LETTER (OUTPATIENT)
Age: 36
End: 2021-01-15

## 2021-03-21 ENCOUNTER — HEALTH MAINTENANCE LETTER (OUTPATIENT)
Age: 36
End: 2021-03-21

## 2021-09-05 ENCOUNTER — HEALTH MAINTENANCE LETTER (OUTPATIENT)
Age: 36
End: 2021-09-05

## 2022-04-17 ENCOUNTER — HEALTH MAINTENANCE LETTER (OUTPATIENT)
Age: 37
End: 2022-04-17

## 2022-10-23 ENCOUNTER — HEALTH MAINTENANCE LETTER (OUTPATIENT)
Age: 37
End: 2022-10-23

## 2023-04-20 ENCOUNTER — PATIENT OUTREACH (OUTPATIENT)
Dept: CARE COORDINATION | Facility: CLINIC | Age: 38
End: 2023-04-20
Payer: COMMERCIAL

## 2023-05-23 ENCOUNTER — LAB REQUISITION (OUTPATIENT)
Dept: LAB | Facility: CLINIC | Age: 38
End: 2023-05-23

## 2023-05-23 DIAGNOSIS — Z13.29 ENCOUNTER FOR SCREENING FOR OTHER SUSPECTED ENDOCRINE DISORDER: ICD-10-CM

## 2023-05-23 DIAGNOSIS — Z13.1 ENCOUNTER FOR SCREENING FOR DIABETES MELLITUS: ICD-10-CM

## 2023-05-23 DIAGNOSIS — Z13.220 ENCOUNTER FOR SCREENING FOR LIPOID DISORDERS: ICD-10-CM

## 2023-05-23 LAB
CHOLEST SERPL-MCNC: 216 MG/DL
HBA1C MFR BLD: 5.5 %
HDLC SERPL-MCNC: 53 MG/DL
LDLC SERPL CALC-MCNC: 119 MG/DL
NONHDLC SERPL-MCNC: 163 MG/DL
TRIGL SERPL-MCNC: 221 MG/DL
TSH SERPL DL<=0.005 MIU/L-ACNC: 1.84 UIU/ML (ref 0.3–4.2)

## 2023-05-23 PROCEDURE — 84443 ASSAY THYROID STIM HORMONE: CPT | Performed by: NURSE PRACTITIONER

## 2023-05-23 PROCEDURE — 80061 LIPID PANEL: CPT | Performed by: NURSE PRACTITIONER

## 2023-05-23 PROCEDURE — 83036 HEMOGLOBIN GLYCOSYLATED A1C: CPT | Performed by: NURSE PRACTITIONER

## 2023-06-01 ENCOUNTER — HEALTH MAINTENANCE LETTER (OUTPATIENT)
Age: 38
End: 2023-06-01

## 2024-05-07 ENCOUNTER — LAB REQUISITION (OUTPATIENT)
Dept: LAB | Facility: CLINIC | Age: 39
End: 2024-05-07

## 2024-05-07 DIAGNOSIS — Z13.220 ENCOUNTER FOR SCREENING FOR LIPOID DISORDERS: ICD-10-CM

## 2024-05-07 DIAGNOSIS — Z13.1 ENCOUNTER FOR SCREENING FOR DIABETES MELLITUS: ICD-10-CM

## 2024-05-07 LAB — HBA1C MFR BLD: 5.3 %

## 2024-05-07 PROCEDURE — 83036 HEMOGLOBIN GLYCOSYLATED A1C: CPT | Performed by: PHYSICIAN ASSISTANT

## 2024-05-07 PROCEDURE — 80061 LIPID PANEL: CPT | Performed by: PHYSICIAN ASSISTANT

## 2024-05-08 LAB
CHOLEST SERPL-MCNC: 223 MG/DL
FASTING STATUS PATIENT QL REPORTED: YES
HDLC SERPL-MCNC: 49 MG/DL
LDLC SERPL CALC-MCNC: 138 MG/DL
NONHDLC SERPL-MCNC: 174 MG/DL
TRIGL SERPL-MCNC: 179 MG/DL

## 2025-05-13 ENCOUNTER — LAB REQUISITION (OUTPATIENT)
Dept: LAB | Facility: CLINIC | Age: 40
End: 2025-05-13

## 2025-05-13 DIAGNOSIS — Z12.4 ENCOUNTER FOR SCREENING FOR MALIGNANT NEOPLASM OF CERVIX: ICD-10-CM

## 2025-05-13 PROCEDURE — 87624 HPV HI-RISK TYP POOLED RSLT: CPT | Performed by: PHYSICIAN ASSISTANT

## 2025-05-14 LAB
HPV HR 12 DNA CVX QL NAA+PROBE: NEGATIVE
HPV16 DNA CVX QL NAA+PROBE: NEGATIVE
HPV18 DNA CVX QL NAA+PROBE: NEGATIVE
HUMAN PAPILLOMA VIRUS FINAL DIAGNOSIS: NORMAL